# Patient Record
Sex: FEMALE | Race: WHITE | NOT HISPANIC OR LATINO | URBAN - METROPOLITAN AREA
[De-identification: names, ages, dates, MRNs, and addresses within clinical notes are randomized per-mention and may not be internally consistent; named-entity substitution may affect disease eponyms.]

---

## 2021-12-06 DIAGNOSIS — M77.8 RIGHT ELBOW TENDONITIS: Primary | ICD-10-CM

## 2021-12-17 ENCOUNTER — EVALUATION (OUTPATIENT)
Dept: PHYSICAL THERAPY | Facility: REHABILITATION | Age: 46
End: 2021-12-17
Payer: COMMERCIAL

## 2021-12-17 DIAGNOSIS — M77.8 RIGHT ELBOW TENDONITIS: Primary | ICD-10-CM

## 2021-12-17 PROCEDURE — 97161 PT EVAL LOW COMPLEX 20 MIN: CPT

## 2021-12-17 PROCEDURE — 97110 THERAPEUTIC EXERCISES: CPT

## 2021-12-20 ENCOUNTER — OFFICE VISIT (OUTPATIENT)
Dept: PHYSICAL THERAPY | Facility: REHABILITATION | Age: 46
End: 2021-12-20
Payer: COMMERCIAL

## 2021-12-20 DIAGNOSIS — M77.8 RIGHT ELBOW TENDONITIS: Primary | ICD-10-CM

## 2021-12-20 PROCEDURE — 97112 NEUROMUSCULAR REEDUCATION: CPT

## 2021-12-20 PROCEDURE — 97110 THERAPEUTIC EXERCISES: CPT

## 2022-01-03 ENCOUNTER — OFFICE VISIT (OUTPATIENT)
Dept: PHYSICAL THERAPY | Facility: REHABILITATION | Age: 47
End: 2022-01-03
Payer: COMMERCIAL

## 2022-01-03 DIAGNOSIS — M77.8 RIGHT ELBOW TENDONITIS: Primary | ICD-10-CM

## 2022-01-03 PROCEDURE — 97140 MANUAL THERAPY 1/> REGIONS: CPT | Performed by: PHYSICAL THERAPIST

## 2022-01-03 PROCEDURE — 97110 THERAPEUTIC EXERCISES: CPT | Performed by: PHYSICAL THERAPIST

## 2022-01-03 NOTE — PROGRESS NOTES
Daily Note     Today's date: 1/3/2022  Patient name: Annalee Ricketts  : 1975  MRN: [id number]  Referring provider: Ge Cagle  Dx:   Encounter Diagnosis     ICD-10-CM    1  Right elbow tendonitis  M77 8        Start Time: 915  Stop Time: 1000  Total time in clinic (min): 45 minutes    Subjective: Patient reports doing much better overall, no resting pain but can feel with certain home movements (coffee lift)      Objective: See treatment diary below  CS AROM WNL   TTP post RC  + radial nerve ANTT - abolished post STM  / ECU MMT  4+/5 pronation/supination MMT    Assessment: Patient able to perform simulated coffee pot movement without pain but did not try with weight today  I did encourage her to try the coffee pot movement 1-2x over the next few days  Education provided on tissue healing, to decrease fear of movement  NV assess scapula for proximal strength      Plan: Continue per plan of care  Precautions: standard      Manuals 12/17 12/20 1/3          ISTM nv Lat elbow  Wrist ext - 5'          Joint Mobs possible nv?             STM post RC   5'          Assessment   5'          Neuro Re-Ed             Flexbar Pron nv 3x15 ylw           Flexbar Ext nv 3x15 ylw           Theraweb Ext nv 3" hold 3x15 ylw           Education tome   10'                                                 Ther Ex             Wrist Extension Eccentrics 2# 3x15 HEP 3# 3x15 3#  3x15          Wrist Flexion Stretch 10"x10 HEP 10"x10           Radial Nerve Glides 2x10 HEP  2x10          Wrist Deviation Eccentrics  3x15 2#           Self STM post RC   tennis ball - 30"                                                 Ther Activity                                       Gait Training                                       Modalities

## 2022-01-11 ENCOUNTER — OFFICE VISIT (OUTPATIENT)
Dept: PHYSICAL THERAPY | Facility: REHABILITATION | Age: 47
End: 2022-01-11
Payer: COMMERCIAL

## 2022-01-11 DIAGNOSIS — M77.8 RIGHT ELBOW TENDONITIS: Primary | ICD-10-CM

## 2022-01-11 PROCEDURE — 97112 NEUROMUSCULAR REEDUCATION: CPT

## 2022-01-11 PROCEDURE — 97110 THERAPEUTIC EXERCISES: CPT

## 2022-01-11 NOTE — PROGRESS NOTES
Daily Note     Today's date: 2022  Patient name: Trinh Helms  : 1975  MRN: [id number]  Referring provider: Leah Gagnon*  Dx:   Encounter Diagnosis     ICD-10-CM    1  Right elbow tendonitis  M77 8        Start Time: 0800  Stop Time: 0845  Total time in clinic (min): 45 minutes    Subjective: Patient reports the only residual discomfort she notices is lifting with pronated wrist, elbow extended, and shoulder flexion  Objective: See treatment diary below  MT:   LT:     Assessment: Weakness in scap stabilizers with strength testing  Updated HEP to include: D2 flexion, high rows, and finger extension with shoulder flexion  Thorough discussion today regarding RTP for tennis  Provided patient with RTP schedule/plan with goal for RTP approximately 22, will adjust timeline based on patient's tolerance  Patient would benefit from continued PT      Plan: Continue per plan of care  Precautions: standard      Manuals 12/17 12/20 1/3 1/11         ISTM nv Lat elbow  Wrist ext - 5'          Joint Mobs possible nv?             STM post RC   5'          Assessment   5'          Neuro Re-Ed             Flexbar Pron nv 3x15 ylw           Flexbar Ext nv 3x15 ylw           Theraweb Ext nv 3" hold 3x15 ylw           Education tome   10'          D2 Flexion    ptb 2x10         High Rows    ptb 2x10         RTP discussion, assessment    25'         Ther Ex             Wrist Extension Eccentrics 2# 3x15 HEP 3# 3x15 3#  3x15          Wrist Flexion Stretch 10"x10 HEP 10"x10           Radial Nerve Glides 2x10 HEP  2x10          Wrist Deviation Eccentrics  3x15 2#           Self STM post RC   tennis ball - 30"          Finger Extension w/ elbow extension and shdr flexion    3x10                                   Ther Activity                                       Gait Training                                       Modalities

## 2022-01-18 ENCOUNTER — OFFICE VISIT (OUTPATIENT)
Dept: PHYSICAL THERAPY | Facility: REHABILITATION | Age: 47
End: 2022-01-18
Payer: COMMERCIAL

## 2022-01-18 DIAGNOSIS — M77.8 RIGHT ELBOW TENDONITIS: Primary | ICD-10-CM

## 2022-01-18 PROCEDURE — 97140 MANUAL THERAPY 1/> REGIONS: CPT

## 2022-01-18 PROCEDURE — 97112 NEUROMUSCULAR REEDUCATION: CPT

## 2022-01-18 PROCEDURE — 97110 THERAPEUTIC EXERCISES: CPT

## 2022-01-18 NOTE — PROGRESS NOTES
Daily Note     Today's date: 2022  Patient name: Tripp Salmeron  : 1975  MRN: [id number]  Referring provider: Amish Rodarte*  Dx:   Encounter Diagnosis     ICD-10-CM    1  Right elbow tendonitis  M77 8        Start Time: 0800  Stop Time: 0830  Total time in clinic (min): 30 minutes    Subjective: Patient reports she was able to begin light tennis last week       Objective: See treatment diary below  Updated HEP to include:  Hammer pronation/supination eccentrics, OH swings with racquet 25-50%    Assessment: Patient had reduced tolerance for OH swing and rapid eccentric control of elbow with tennis racquet  Significant improvements in pain and tolerance following ISTM to lateral epicondyle and triceps insertion  Patient will begin 50% intensity OH swings to tolerance and play tennis 2x/week  Will reassess symptoms next week  Patient would benefit from continued PT      Plan: Continue per plan of care  Precautions: standard      Manuals 12/17 12/20 1/3 1/11 1/18        ISTM nv Lat elbow  Wrist ext - 5'  Lat elbow tricps insertion        Joint Mobs possible nv?             STM post RC   5'          Assessment   5'          Neuro Re-Ed             Flexbar Pron nv 3x15 ylw           Flexbar Ext nv 3x15 ylw           Theraweb Ext nv 3" hold 3x15 ylw           Education tome   10'          D2 Flexion    ptb 2x10         High Rows    ptb 2x10         RTP discussion, assessment    25' 15'        Ther Ex             Wrist Extension Eccentrics 2# 3x15 HEP 3# 3x15 3#  3x15          Wrist Flexion Stretch 10"x10 HEP 10"x10           Radial Nerve Glides 2x10 HEP  2x10          Wrist Deviation Eccentrics  3x15 2#           Self STM post RC   tennis ball - 30"          Finger Extension w/ elbow extension and shdr flexion    3x10         Hammer Pron/Sup eccentrics     x15 ea HEP                     Ther Activity                                       Gait Training Modalities

## 2022-01-25 ENCOUNTER — OFFICE VISIT (OUTPATIENT)
Dept: PHYSICAL THERAPY | Facility: REHABILITATION | Age: 47
End: 2022-01-25
Payer: COMMERCIAL

## 2022-01-25 DIAGNOSIS — M77.8 RIGHT ELBOW TENDONITIS: Primary | ICD-10-CM

## 2022-01-25 PROCEDURE — 97112 NEUROMUSCULAR REEDUCATION: CPT

## 2022-01-25 PROCEDURE — 97140 MANUAL THERAPY 1/> REGIONS: CPT

## 2022-01-25 PROCEDURE — 97110 THERAPEUTIC EXERCISES: CPT

## 2022-01-25 NOTE — PROGRESS NOTES
Daily Note     Today's date: 2022  Patient name: Trinh Helms  : 1975  MRN: [id number]  Referring provider: Claude Posey*  Dx:   Encounter Diagnosis     ICD-10-CM    1  Right elbow tendonitis  M77 8        Start Time:   Stop Time:   Total time in clinic (min): 35 minutes    Subjective: Patient reports she was able to play tennis for 90 minutes 2x last week  Patient reports still having pain with her overhand serve  Pa      Objective: See treatment diary below      Assessment: Pain reproduced with OH swing, improved with ISTM and taping  Patient will progress her overhand serve slowly over the next couple weeks  Patient encouraged to continue loading her elbow  Will re-assess patient in a couple weeks to determine further need for POC  Patient would benefit from continued PT      Plan: Continue per plan of care  Precautions: standard      Manuals 12/17 12/20 1/3 1/11 1/18 1/25       ISTM nv Lat elbow  Wrist ext - 5'  Lat elbow tricps insertion brachioradialis/lat elbow       K-Taping      extension assist       Joint Mobs possible nv?             STM post RC   5'          Assessment   5'   15'       Neuro Re-Ed             Flexbar Pron nv 3x15 ylw           Flexbar Ext nv 3x15 ylw           Theraweb Ext nv 3" hold 3x15 ylw           Education tome   10'          D2 Flexion    ptb 2x10         High Rows    ptb 2x10         RTP discussion, assessment    25' 15'        Ther Ex             Wrist Extension Eccentrics 2# 3x15 HEP 3# 3x15 3#  3x15          Wrist Flexion Stretch 10"x10 HEP 10"x10           Radial Nerve Glides 2x10 HEP  2x10          Wrist Deviation Eccentrics  3x15 2#           Self STM post RC   tennis ball - 30"          Finger Extension w/ elbow extension and shdr flexion    3x10         Hammer Pron/Sup eccentrics     x15 ea HEP                     Ther Activity                                       Gait Training                                       Modalities

## 2022-02-08 ENCOUNTER — OFFICE VISIT (OUTPATIENT)
Dept: PHYSICAL THERAPY | Facility: REHABILITATION | Age: 47
End: 2022-02-08
Payer: COMMERCIAL

## 2022-02-08 DIAGNOSIS — M77.8 RIGHT ELBOW TENDONITIS: Primary | ICD-10-CM

## 2022-02-08 PROCEDURE — 97110 THERAPEUTIC EXERCISES: CPT

## 2022-02-08 NOTE — PROGRESS NOTES
Daily Note     Today's date: 2022  Patient name: Avinash Franco  : 1975  MRN: [id number]  Referring provider: Vianney Richmond*  Dx:   Encounter Diagnosis     ICD-10-CM    1  Right elbow tendonitis  M77 8        Start Time: 0730  Stop Time: 0800  Total time in clinic (min): 30 minutes    Subjective: Patient reports playing 2hrs continuously of competitive tennis without trouble  Reports some very mild soreness in elbow with serving and soreness in posterior shoulder that lasted for 2-3 days following  Patient reports feeling 90% full RTS, patient feels she is capable of managing the last 10% with HEP  Objective: See treatment diary below  Right Shoulder PROM:  WNL throughout    Right Shoulder Strength Testing:  MT: 4+  LT: 4+    Assessment:   Patient has attended PT for 7 visits since 19 and has overall made excellent improvements in right elbow pain and function  Patient has been able to return to play for a total of 2 weeks with very mild symptoms  Reviewed and updated HEP to include strength of RTC and scapular musculature  Discussed appropriate loading strategies for elbow and shoulder as patient begins to increase her tolerance for tennis  Patient will be discharged at this time to [facility]  Pain Levels: 1/10    Short Term Goals (Week 4): met all  1  Decreased pain by 50%  2  Improve ROM by 10 degrees   3  Improve strength by 1/2 measure      Long Term Goals (8 weeks): met all  1  Return to competitive tennis <1/10 pain  2  FOTO score >70  3  Fully independent with HEP by discharge    Plan: Discharge to [facility]  Patient will f/u with PT as needed  Precautions: standard      Manuals 12/17 12/20 1/3 1/11 1/18 1/25 2/8      ISTM nv Lat elbow  Wrist ext - 5'  Lat elbow tricps insertion brachioradialis/lat elbow       K-Taping      extension assist       Joint Mobs possible nv?             STM post RC   5'          Assessment   5'   15' 30'      Neuro Re-Ed             [facility] nv 3x15 ylw           Flexbar Ext nv 3x15 ylw           Theraweb Ext nv 3" hold 3x15 ylw           Education tome   10'          D2 Flexion    ptb 2x10         High Rows    ptb 2x10         RTP discussion, assessment    25' 15'        Ther Ex             Wrist Extension Eccentrics 2# 3x15 HEP 3# 3x15 3#  3x15          Wrist Flexion Stretch 10"x10 HEP 10"x10           Radial Nerve Glides 2x10 HEP  2x10          Wrist Deviation Eccentrics  3x15 2#           Self STM post RC   tennis ball - 30"          Finger Extension w/ elbow extension and shdr flexion    3x10         Hammer Pron/Sup eccentrics     x15 ea HEP        Pball Prone YTs       2x10 HEP      Pball Prone 90/90 Er       2x10 HEP                                Ther Activity                                       Gait Training                                       Modalities

## 2023-02-07 ENCOUNTER — EVALUATION (OUTPATIENT)
Dept: PHYSICAL THERAPY | Facility: REHABILITATION | Age: 48
End: 2023-02-07

## 2023-02-07 DIAGNOSIS — M25.511 ACUTE PAIN OF RIGHT SHOULDER: Primary | ICD-10-CM

## 2023-02-07 NOTE — LETTER
2023    Christofer Rosas   [address] [facility] [address]    Patient: Mohsen Leary   YOB: 1975   Date of Visit: 2023     Encounter Diagnosis     ICD-10-CM    1  Acute pain of right shoulder  M25 511           Dear Dr Neva Otero: Thank you for your recent referral of Mohsen Leary  Please review the attached evaluation summary from Amanda's recent visit  Please verify that you agree with the plan of care by signing the attached order  If you have any questions or concerns, please do not hesitate to call  I sincerely appreciate the opportunity to share in the care of one of your patients and hope to have another opportunity to work with you in the near future  Sincerely,    Yisel Baxter, PT      Referring Provider:      I certify that I have read the below Plan of Care and certify the need for these services furnished under this plan of treatment while under my care  Christofer Rosas   [address] [facility] [address]  Via Fax: [phone]          PT Evaluation     Today's date: 2023  Patient name: Mohsen Leary  : 1975  MRN: [id number]  Referring provider: Gerson Willard*  Dx:   Encounter Diagnosis     ICD-10-CM    1  Acute pain of right shoulder  M25 511           Start Time: 0800  Stop Time: 0845  Total time in clinic (min): 45 minutes    Assessment  Assessment details: Problem List:  1) Acute right shoulder pain    Mohsen Leary is a pleasant 50 y o  female who presents with acute right shoulder pain  Raenette Stagers demonstrates reduced mild scapular dyskinesis, periscapular strength deficits, and pain with activity resulting in concern at no signs of improvement, fear of not being able to keep active and future ill health (and wanting to prevent it)  No further referral appears necessary at this time based upon examination results  I expect she will improve in 6-8 weeks    Positive prognostic indicators include positive attitude toward recovery, good understanding of diagnosis and treatment plan options, acuity of symptoms, absence of peripheralization and absence of observed red flags  Negative prognostic indicators include  none  Impairments: abnormal muscle tone, abnormal or restricted ROM, impaired physical strength, lacks appropriate home exercise program, pain with function and scapular dyskinesis    Symptom irritability: moderateUnderstanding of Dx/Px/POC: good   Prognosis: good    Goals  Short Term Goals (Week 4):  1  Decreased pain by 50%  2  Tolerate 50% intensity of tennis 2x/week   3  Improve strength by 1/2 measure      Long Term Goals (8 weeks):  1  Return to sport without being limited by symptoms  2  Patient will exceed FOTO predicted outcome score  3  Patient will be fully independent with HEP by discharge  4  Patient will be able to manage symptoms independently  Plan  Patient would benefit from: skilled physical therapy  Planned therapy interventions: joint mobilization, manual therapy, activity modification, neuromuscular re-education, patient education, behavior modification, strengthening, stretching, therapeutic exercise, therapeutic activities, functional ROM exercises, flexibility, graded activity and home exercise program  Frequency: 2x week  Duration in visits: 12  Duration in weeks: 6  Treatment plan discussed with: patient        Subjective Evaluation    History of Present Illness  Mechanism of injury: Patient reports acute right shoulder pain that has been building up over the last 3 months due to ongoing  Patient reports her symptoms are localized to lateral shoulder, clavicle, and posterior shoulder which can radiate into her elbow at times  Patient reports no symptoms when playing tennis except for a full serve but will report symptoms later on after  Patient reports no pain at night   Symptoms will improve by next day but can carry over into her next  Patient is limiting herself at home to maintain playing 2x/week with tennis     Pain  Current pain rating: 3  At worst pain rating: 10  Quality: throbbing  Progression: no change    Patient Goals  Patient goals for therapy: decreased pain, increased strength, independence with ADLs/IADLs, return to sport/leisure activities and increased motion          Objective  Shoulder Range of Motion and Strength Testing   PROM AROM MMTs    L R L R L R   Flexion 150 150 150 150 4+/5 4+/5   Abduction 150 150 150 150 4+/5 4+/5   ER 90 85 WNL WNL 5/5 5/5   IR 45 50 WNL WNL 5/5 5/5   MT     4/5 4-/5   LT     4/5 3+/5       Neurodynamic Testing  Median Nerve: negative  Ulnar Nerve: negative  Radial Nerve: negative      Thoracic Mobility Testing  Hypomobility noted on right t-spine T4-7  Palpable TrPs    Special Tests  Impingement:   Barber (-), Infraspinatus (-), Painful Arc (+), Destinee's (-), Neers (-)    Rotator Cuff:   Drop Arm (-), Infraspinatus (-), Painful Arc (+), Belly Press (-), ER Lag Sign (-)    Labrum:   Speed's (-), Yergason's (-), Baltic's (-), Biceps Load II (-), Anterior Load (-), Crank Test (-), Dynamic Shear (-)                Precautions: standard      Manuals 2/7            ART lat and subscap nv            Scap mob nv                                      Neuro Re-Ed             Prone I HEP            Prone T HEP            Supine [employer] 1/2 Roll HEP                                                                Ther Ex                                                                                                                     Ther Activity                                       Gait Training                                       Modalities

## 2023-02-07 NOTE — PROGRESS NOTES
PT Evaluation     Today's date: 2023  Patient name: Alcira Lopez  : 1975  MRN: [id number]  Referring provider: Radha Mazariegos*  Dx:   Encounter Diagnosis     ICD-10-CM    1  Acute pain of right shoulder  M25 511           Start Time: 0800  Stop Time: 0845  Total time in clinic (min): 45 minutes    Assessment  Assessment details: Problem List:  1) Acute right shoulder pain    Alcira Lopez is a pleasant 50 y o  female who presents with acute right shoulder pain  James Messing demonstrates reduced mild scapular dyskinesis, periscapular strength deficits, and pain with activity resulting in concern at no signs of improvement, fear of not being able to keep active and future ill health (and wanting to prevent it)  No further referral appears necessary at this time based upon examination results  I expect she will improve in 6-8 weeks  Positive prognostic indicators include positive attitude toward recovery, good understanding of diagnosis and treatment plan options, acuity of symptoms, absence of peripheralization and absence of observed red flags  Negative prognostic indicators include  none  Impairments: abnormal muscle tone, abnormal or restricted ROM, impaired physical strength, lacks appropriate home exercise program, pain with function and scapular dyskinesis    Symptom irritability: moderateUnderstanding of Dx/Px/POC: good   Prognosis: good    Goals  Short Term Goals (Week 4):  1  Decreased pain by 50%  2  Tolerate 50% intensity of tennis 2x/week   3  Improve strength by 1/2 measure      Long Term Goals (8 weeks):  1  Return to sport without being limited by symptoms  2  Patient will exceed FOTO predicted outcome score  3  Patient will be fully independent with HEP by discharge  4  Patient will be able to manage symptoms independently         Plan  Patient would benefit from: skilled physical therapy  Planned therapy interventions: joint mobilization, manual therapy, activity modification, neuromuscular re-education, patient education, behavior modification, strengthening, stretching, therapeutic exercise, therapeutic activities, functional ROM exercises, flexibility, graded activity and home exercise program  Frequency: 2x week  Duration in visits: 12  Duration in weeks: 6  Treatment plan discussed with: patient        Subjective Evaluation    History of Present Illness  Mechanism of injury: Patient reports acute right shoulder pain that has been building up over the last 3 months due to ongoing  Patient reports her symptoms are localized to lateral shoulder, clavicle, and posterior shoulder which can radiate into her elbow at times  Patient reports no symptoms when playing tennis except for a full serve but will report symptoms later on after  Patient reports no pain at night  Symptoms will improve by next day but can carry over into her next  Patient is limiting herself at home to maintain playing 2x/week with tennis     Pain  Current pain rating: 3  At worst pain rating: 10  Quality: throbbing  Progression: no change    Patient Goals  Patient goals for therapy: decreased pain, increased strength, independence with ADLs/IADLs, return to sport/leisure activities and increased motion          Objective  Shoulder Range of Motion and Strength Testing   PROM AROM MMTs    L R L R L R   Flexion 150 150 150 150 4+/5 4+/5   Abduction 150 150 150 150 4+/5 4+/5   ER 90 85 WNL WNL 5/5 5/5   IR 45 50 WNL WNL 5/5 5/5   MT     4/5 4-/5   LT     4/5 3+/5       Neurodynamic Testing  Median Nerve: negative  Ulnar Nerve: negative  Radial Nerve: negative      Thoracic Mobility Testing  Hypomobility noted on right t-spine T4-7  Palpable TrPs    Special Tests  Impingement:   Barber (-), Infraspinatus (-), Painful Arc (+), Destinee's (-), Neers (-)    Rotator Cuff:   Drop Arm (-), Infraspinatus (-), Painful Arc (+), Belly Press (-), ER Lag Sign (-)    Labrum:   Speed's (-), Yergason's (-), Kingman's (-), Biceps Load II (-), Anterior Load (-), Crank Test (-), Dynamic Shear (-)                 Precautions: standard      Manuals 2/7            ART lat and subscap nv            Scap mob nv                                      Neuro Re-Ed             Prone I HEP            Prone T HEP            Supine [employer] 1/2 Roll HEP                                                                Ther Ex                                                                                                                     Ther Activity                                       Gait Training                                       Modalities

## 2023-02-14 ENCOUNTER — APPOINTMENT (OUTPATIENT)
Dept: PHYSICAL THERAPY | Facility: REHABILITATION | Age: 48
End: 2023-02-14